# Patient Record
Sex: FEMALE | Race: WHITE | NOT HISPANIC OR LATINO | Employment: PART TIME | URBAN - METROPOLITAN AREA
[De-identification: names, ages, dates, MRNs, and addresses within clinical notes are randomized per-mention and may not be internally consistent; named-entity substitution may affect disease eponyms.]

---
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
## 2024-07-11 DIAGNOSIS — Z30.41 ENCOUNTER FOR SURVEILLANCE OF CONTRACEPTIVE PILLS: ICD-10-CM

## 2024-07-11 RX ORDER — NORGESTIMATE AND ETHINYL ESTRADIOL 0.25-0.035
1 KIT ORAL DAILY
Qty: 84 TABLET | Refills: 0 | OUTPATIENT
Start: 2024-07-11

## 2024-09-05 DIAGNOSIS — Z30.41 ENCOUNTER FOR SURVEILLANCE OF CONTRACEPTIVE PILLS: ICD-10-CM

## 2024-09-05 RX ORDER — NORGESTIMATE AND ETHINYL ESTRADIOL 0.25-0.035
1 KIT ORAL DAILY
Qty: 90 TABLET | Refills: 3 | Status: SHIPPED | OUTPATIENT
Start: 2024-09-05 | End: 2025-09-05

## 2024-09-05 NOTE — TELEPHONE ENCOUNTER
Last office visit: 8/9/2023  Next office visit: 9/19/2024      [facility] patient establishing with you

## 2024-09-19 ENCOUNTER — APPOINTMENT (OUTPATIENT)
Dept: PRIMARY CARE | Facility: CLINIC | Age: 19
End: 2024-09-19
Payer: COMMERCIAL

## 2024-09-19 VITALS
SYSTOLIC BLOOD PRESSURE: 108 MMHG | OXYGEN SATURATION: 98 % | BODY MASS INDEX: 20.06 KG/M2 | DIASTOLIC BLOOD PRESSURE: 75 MMHG | WEIGHT: 120.4 LBS | HEIGHT: 65 IN | HEART RATE: 101 BPM

## 2024-09-19 DIAGNOSIS — Z11.3 SCREENING FOR STD (SEXUALLY TRANSMITTED DISEASE): ICD-10-CM

## 2024-09-19 DIAGNOSIS — Z00.00 ANNUAL PHYSICAL EXAM: Primary | ICD-10-CM

## 2024-09-19 DIAGNOSIS — Z13.220 SCREENING FOR LIPID DISORDERS: ICD-10-CM

## 2024-09-19 DIAGNOSIS — Z11.59 ENCOUNTER FOR HEPATITIS C SCREENING TEST FOR LOW RISK PATIENT: ICD-10-CM

## 2024-09-19 DIAGNOSIS — Z11.4 SCREENING FOR HIV WITHOUT PRESENCE OF RISK FACTORS: ICD-10-CM

## 2024-09-19 PROCEDURE — 3008F BODY MASS INDEX DOCD: CPT | Performed by: NURSE PRACTITIONER

## 2024-09-19 PROCEDURE — 1036F TOBACCO NON-USER: CPT | Performed by: NURSE PRACTITIONER

## 2024-09-19 PROCEDURE — 99395 PREV VISIT EST AGE 18-39: CPT | Performed by: NURSE PRACTITIONER

## 2024-09-19 ASSESSMENT — PATIENT HEALTH QUESTIONNAIRE - PHQ9
1. LITTLE INTEREST OR PLEASURE IN DOING THINGS: NOT AT ALL
2. FEELING DOWN, DEPRESSED OR HOPELESS: NOT AT ALL
SUM OF ALL RESPONSES TO PHQ9 QUESTIONS 1 AND 2: 0

## 2024-09-19 NOTE — PROGRESS NOTES
Date of Last Office Visit: 8/13/2024  [facility]      RTC: 6wks  No shows: 0  Cancellations: 0  Date of Next Office Visit: Selected Appointment   10/1/2024 8:00 AM (30 min)  Jessica   Arrive by:  7:45 AM   ADULT PSYCHIATRY RETURN    Rfl Status: Authorized   URPSY (P [facility])   Aleksandr Deutsch MD     ------------------------------    Medication requested:    naloxone (NARCAN) 4 MG/0.1ML nasal spray 0.2 mL 1 8/22/2023 -- No   Sig - Route: Spray 1 spray (4 mg) into one nostril alternating nostrils as needed for opioid reversal every 2-3 minutes until assistance arrives - Alternating Nostrils     ------------------------------      Refill decision: Medication refilled per protocol.                          Subjective   Reason for Visit: Zuly Loza is an 19 y.o. female here for a CPE     Chief Complaint   Patient presents with    Annual Exam     Zuly Loza is a 19 y.o. female is here today for a CPE. She is transferring care from [facility]. Patient reports no questions or concerns at this time.  Patient admits to not taking her sertraline prescription consistently.          HPI  Zuly is a 20 yo female presenting today to est with new PCP/Annual CPE  Previous PCP: [facility]  LOV: Aug 2023    Dx: Anxiety    Patient states to be in usual state of health without any recent illnesses, hospitalizations, and no current or remote infections.     Pt denies any acute c/o today    Pt is Rx Zoloft 50 mg for anxiety  Stopped taking @ 4 months ago--> she forgets to take it  No refills   She does not have any  concerns about her anxiety despite not taking Rx     #CPE   Occupation: [employer]  Does  on the side    Do you take any herbs or supplements that were not prescribed by a doctor? Severo     Colon Cancer Screening: N/A due to age < 45    LMP: 9/4/2024  PAP: N/A due to age   Mammogram:  N/A due to age < 40  GYN: none   Sexually active: yes   # Partners: 1   STI screening: Gonorrhea/chlamydia due     Last eye exam: has scheduled   Last dental Exam: 2024  Exercise: not regularly   Mood: no concerns   Sleep: no concerns   Vaccines: UTD       Health Maintenance Due   Topic Date Due    Lipid Panel  Never done    HIV Screening  Never done    Well Child Visit (WCV) - Annual  Never done    Hearing Screening (1) Never done    Hepatitis A Vaccines (2 of 2 - 2-dose series) 11/14/2021    Hepatitis C Screening  Never done    HPV Vaccines (3 - 3-dose series) 02/02/2023    Influenza Vaccine (1) 09/01/2024    COVID-19 Vaccine (1 - 2023-24 season) Never done       No Known Allergies            No visits with results within 60 Day(s) from this visit.   Latest known visit with results  "is:   No results found for any previous visit.         Patient Care Team:  GEORGI Montoya as PCP - General (Family Medicine)  GEORGI Fuchs as PCP - Nathalia COLES PCP     Review of Systems  ROS was completed and all systems are negative with the exception of what was noted in the the HPI.       Objective   Vitals:  /75   Pulse 101   Ht 1.638 m (5' 4.5\")   Wt 54.6 kg (120 lb 6.4 oz)   SpO2 98%   BMI 20.35 kg/m²       Current Outpatient Medications   Medication Instructions    norgestimate-ethinyl estradioL (Sprintec, 28,) 0.25-35 mg-mcg tablet 1 tablet, oral, Daily    sertraline (ZOLOFT) 50 mg, oral, Daily       Physical Exam  Vitals reviewed.   HENT:      Right Ear: Tympanic membrane normal.      Left Ear: Tympanic membrane normal.      Mouth/Throat:      Mouth: Mucous membranes are moist.   Eyes:      Conjunctiva/sclera: Conjunctivae normal.   Cardiovascular:      Pulses: Normal pulses.      Heart sounds: Normal heart sounds.   Pulmonary:      Effort: Pulmonary effort is normal.      Breath sounds: Normal breath sounds.   Abdominal:      General: Bowel sounds are normal.   Musculoskeletal:         General: Normal range of motion.   Skin:     General: Skin is warm and dry.   Neurological:      Mental Status: She is alert and oriented to person, place, and time.         Assessment & Plan  Annual physical exam  - Counseled on healthy diet and regular exercise  - Fall avoidance information provided  - Personalized prevention plan provided   - Vaccines UTD   - Lipid panel ordered  - Pt agreeable to HIV/HEP C screening tests        Screening for STD (sexually transmitted disease)    Orders:    C. Trachomatis / N. Gonorrhoeae, Amplified Detection; Future    Encounter for hepatitis C screening test for low risk patient    Orders:    Hepatitis C Antibody; Future    Screening for lipid disorders    Orders:    Lipid Panel Non-Fasting; Future    Screening for HIV without presence of " risk factors    Orders:    HIV 1/2 Antigen/Antibody Screen with Reflex to Confirmation; Future

## 2024-09-19 NOTE — PATIENT INSTRUCTIONS
Thank you for seeing me today Zuly Loza, it was a pleasure to meet you!    Today we did your Annual Physical  Wellness Exam and discussed the following:     Go to lab for ordered testing     Continue OCP    Vaccines are important! Please reconsider a flu shot and the Covid-19 vaccine  - Yearly seasonal flu shots are recommended, high dose is indicated for patient over 65.   - Tetanus boosters should be given every 10 yrs, this also covers for diphtheria and pertussis.   - most insurances cover the 2-shot series for shingles (shingrix) after the age of 50.   - Pneumonia vaccines are given routinely at age 65, however if you have a chronic heart or lung diagnosis this may be given before age 65.     Preventative cancer screens SAVE LIVES!  - Cervical cancer screening through the use of a PAP test, is done on all women aged 21-65.  - mammograms are recommend yearly starting at the age of 40 in women, sometimes sooner based on family history.   - Colon cancer screening is recommended at age 50 for all patients, sometimes sooner based on family history.    - other tests may be recommended if you are a smoker!     150 minutes of aerobic exercise is advised for good cardiovascular health and to maintain a healthy weight.   Please try to work on maintaining a healthy body weight, with a BMI close to or at a BMI 19-25.    I recommend a whole-foods, plant-based diet, filled with fresh fruits, vegetables, seeds, beans, nuts and berries.    Discussed smoking cessation/Abstinence is best.     Abstaining from the use of alcohol is best. However if you choose to drink current guidelines are 2 or less drinks per day for men and 1.5 or less per day for women (and not all saved for one night on the weekend).    For assistance with scheduling referrals or consultations, please call [phone] or [phone].    For laboratory, radiology, and other tests, please call [phone] ([phone] for pediatrics).   If you do  not get results within 7-10 days, or you have any questions or concerns, please send a message, call the office ([phone]), or return to the office for a follow-up appointment.     For acute/sick visits, if you are unable to get an office visit, you can do a  On Demand Virtual Visit that is accessible via your My Chart account.  For emergencies, call 9-1-1 or go to the nearest Emergency Department.     Please schedule additional appointment(s) to address concern(s) not addressed today.    Please review prescription inserts and published information for possible adverse effects of all medications.     In general, results are discussed over the phone or via  [employer].     You can see your health information, review clinical summaries from office visits & test results online when you follow your health with MY  Chart, a personal health record.   To sign up go to [email or web address].   If you need assistance with signing up or trouble getting into your account call [employer] Patient Line 24/7 at [phone]     RTC ANNUALLY AND AS NEEDED     Take Care,     LISA Willingham

## 2024-09-19 NOTE — ASSESSMENT & PLAN NOTE
- Counseled on healthy diet and regular exercise  - Fall avoidance information provided  - Personalized prevention plan provided   - Vaccines UTD   - Lipid panel ordered  - Pt agreeable to HIV/HEP C screening tests

## 2025-04-16 DIAGNOSIS — F41.9 ANXIETY: ICD-10-CM

## 2025-04-16 RX ORDER — SERTRALINE HYDROCHLORIDE 50 MG/1
50 TABLET, FILM COATED ORAL DAILY
Qty: 90 TABLET | Refills: 0 | Status: SHIPPED | OUTPATIENT
Start: 2025-04-16

## 2025-07-17 DIAGNOSIS — F41.9 ANXIETY: ICD-10-CM

## 2025-07-17 RX ORDER — SERTRALINE HYDROCHLORIDE 50 MG/1
50 TABLET, FILM COATED ORAL DAILY
Qty: 90 TABLET | Refills: 0 | Status: SHIPPED | OUTPATIENT
Start: 2025-07-17

## 2025-08-06 DIAGNOSIS — Z30.41 ENCOUNTER FOR SURVEILLANCE OF CONTRACEPTIVE PILLS: ICD-10-CM

## 2025-08-06 RX ORDER — NORGESTIMATE AND ETHINYL ESTRADIOL 0.25-0.035
1 KIT ORAL DAILY
Qty: 84 TABLET | Refills: 0 | Status: SHIPPED | OUTPATIENT
Start: 2025-08-06